# Patient Record
Sex: FEMALE | Race: BLACK OR AFRICAN AMERICAN | Employment: UNEMPLOYED
[De-identification: names, ages, dates, MRNs, and addresses within clinical notes are randomized per-mention and may not be internally consistent; named-entity substitution may affect disease eponyms.]

---

## 2024-06-10 ENCOUNTER — HOSPITAL ENCOUNTER (OUTPATIENT)
Facility: HOSPITAL | Age: 49
Discharge: HOME | End: 2024-06-13
Payer: MEDICARE

## 2024-06-10 VITALS — HEIGHT: 65 IN | BODY MASS INDEX: 41.69 KG/M2 | WEIGHT: 250.22 LBS

## 2024-06-10 DIAGNOSIS — Z12.31 SCREENING MAMMOGRAM FOR HIGH-RISK PATIENT: ICD-10-CM

## 2024-06-10 PROCEDURE — 77067 SCR MAMMO BI INCL CAD: CPT

## 2024-07-10 ENCOUNTER — HOSPITAL ENCOUNTER (OUTPATIENT)
Age: 49
Discharge: HOME | End: 2024-07-13
Payer: MEDICARE

## 2024-07-10 ENCOUNTER — TRANSCRIBE ORDERS (OUTPATIENT)
Age: 49
End: 2024-07-10

## 2024-07-10 DIAGNOSIS — Z79.899 NEED FOR PROPHYLACTIC CHEMOTHERAPY: Primary | ICD-10-CM

## 2024-07-10 DIAGNOSIS — Z79.899 NEED FOR PROPHYLACTIC CHEMOTHERAPY: ICD-10-CM

## 2024-07-10 LAB
ALBUMIN SERPL-MCNC: 3.2 G/DL (ref 3.4–5)
ALBUMIN/GLOB SERPL: 0.8 (ref 0.8–1.7)
ALP SERPL-CCNC: 55 U/L (ref 45–117)
ALT SERPL-CCNC: 18 U/L (ref 13–56)
ANION GAP SERPL CALC-SCNC: 6 MMOL/L (ref 3–18)
AST SERPL W P-5'-P-CCNC: 20 U/L (ref 10–38)
BASOPHILS # BLD: 0.1 K/UL (ref 0–0.1)
BASOPHILS NFR BLD: 1 % (ref 0–2)
BILIRUB SERPL-MCNC: 0.2 MG/DL (ref 0.2–1)
BUN SERPL-MCNC: 11 MG/DL (ref 7–18)
BUN/CREAT SERPL: 14 (ref 12–20)
CA-I BLD-MCNC: 9.1 MG/DL (ref 8.5–10.1)
CHLORIDE SERPL-SCNC: 108 MMOL/L (ref 100–111)
CO2 SERPL-SCNC: 27 MMOL/L (ref 21–32)
CREAT SERPL-MCNC: 0.76 MG/DL (ref 0.6–1.3)
DIFFERENTIAL METHOD BLD: ABNORMAL
EOSINOPHIL # BLD: 0.2 K/UL (ref 0–0.4)
EOSINOPHIL NFR BLD: 2 % (ref 0–5)
ERYTHROCYTE [DISTWIDTH] IN BLOOD BY AUTOMATED COUNT: 13.7 % (ref 11.6–14.5)
GLOBULIN SER CALC-MCNC: 3.8 G/DL (ref 2–4)
GLUCOSE SERPL-MCNC: 107 MG/DL (ref 74–99)
HCT VFR BLD AUTO: 35.1 % (ref 35–45)
HGB BLD-MCNC: 12.4 G/DL (ref 12–16)
IMM GRANULOCYTES # BLD AUTO: 0.1 K/UL (ref 0–0.04)
IMM GRANULOCYTES NFR BLD AUTO: 1 % (ref 0–0.5)
LYMPHOCYTES # BLD: 4.4 K/UL (ref 0.9–3.6)
LYMPHOCYTES NFR BLD: 41 % (ref 21–52)
MCH RBC QN AUTO: 28.3 PG (ref 24–34)
MCHC RBC AUTO-ENTMCNC: 35.3 G/DL (ref 31–37)
MCV RBC AUTO: 80.1 FL (ref 78–100)
MONOCYTES # BLD: 1 K/UL (ref 0.05–1.2)
MONOCYTES NFR BLD: 10 % (ref 3–10)
NEUTS SEG # BLD: 4.9 K/UL (ref 1.8–8)
NEUTS SEG NFR BLD: 45 % (ref 40–73)
NRBC # BLD: 0 K/UL (ref 0–0.01)
NRBC BLD-RTO: 0 PER 100 WBC
PLATELET # BLD AUTO: 347 K/UL (ref 135–420)
PMV BLD AUTO: 11.2 FL (ref 9.2–11.8)
POTASSIUM SERPL-SCNC: 3.8 MMOL/L (ref 3.5–5.5)
PROT SERPL-MCNC: 7 G/DL (ref 6.4–8.2)
RBC # BLD AUTO: 4.38 M/UL (ref 4.2–5.3)
SODIUM SERPL-SCNC: 141 MMOL/L (ref 136–145)
TSH SERPL DL<=0.05 MIU/L-ACNC: 1.23 UIU/ML (ref 0.36–3.74)
VALPROATE SERPL-MCNC: 61 UG/ML (ref 50–100)
WBC # BLD AUTO: 10.7 K/UL (ref 4.6–13.2)

## 2024-07-10 PROCEDURE — 80053 COMPREHEN METABOLIC PANEL: CPT

## 2024-07-10 PROCEDURE — 84443 ASSAY THYROID STIM HORMONE: CPT

## 2024-07-10 PROCEDURE — 85025 COMPLETE CBC W/AUTO DIFF WBC: CPT

## 2024-07-10 PROCEDURE — 83036 HEMOGLOBIN GLYCOSYLATED A1C: CPT

## 2024-07-10 PROCEDURE — 36415 COLL VENOUS BLD VENIPUNCTURE: CPT

## 2024-07-10 PROCEDURE — 80061 LIPID PANEL: CPT

## 2024-07-10 PROCEDURE — 80164 ASSAY DIPROPYLACETIC ACD TOT: CPT

## 2024-07-11 LAB
CHOLEST SERPL-MCNC: 181 MG/DL
EST. AVERAGE GLUCOSE BLD GHB EST-MCNC: 114 MG/DL
HBA1C MFR BLD: 5.6 % (ref 4.2–5.6)
HDLC SERPL-MCNC: 50 MG/DL (ref 40–60)
HDLC SERPL: 3.6 (ref 0–5)
LDLC SERPL CALC-MCNC: 100.4 MG/DL (ref 0–100)
LIPID PANEL: ABNORMAL
TRIGL SERPL-MCNC: 153 MG/DL
VLDLC SERPL CALC-MCNC: 30.6 MG/DL

## 2024-07-21 ENCOUNTER — HOSPITAL ENCOUNTER (EMERGENCY)
Age: 49
Discharge: HOME | End: 2024-07-21
Attending: EMERGENCY MEDICINE
Payer: MEDICARE

## 2024-07-21 VITALS
OXYGEN SATURATION: 99 % | SYSTOLIC BLOOD PRESSURE: 176 MMHG | RESPIRATION RATE: 18 BRPM | TEMPERATURE: 99.2 F | DIASTOLIC BLOOD PRESSURE: 99 MMHG | WEIGHT: 262 LBS | HEART RATE: 95 BPM | BODY MASS INDEX: 43.65 KG/M2 | HEIGHT: 65 IN

## 2024-07-21 DIAGNOSIS — I10 POORLY-CONTROLLED HYPERTENSION: ICD-10-CM

## 2024-07-21 DIAGNOSIS — K04.7 DENTAL ABSCESS: Primary | ICD-10-CM

## 2024-07-21 PROCEDURE — 99283 EMERGENCY DEPT VISIT LOW MDM: CPT

## 2024-07-21 PROCEDURE — 6370000000 HC RX 637 (ALT 250 FOR IP): Performed by: EMERGENCY MEDICINE

## 2024-07-21 RX ORDER — AMOXICILLIN AND CLAVULANATE POTASSIUM 875; 125 MG/1; MG/1
1 TABLET, FILM COATED ORAL
Status: COMPLETED | OUTPATIENT
Start: 2024-07-21 | End: 2024-07-21

## 2024-07-21 RX ORDER — AMOXICILLIN AND CLAVULANATE POTASSIUM 875; 125 MG/1; MG/1
1 TABLET, FILM COATED ORAL 2 TIMES DAILY
Qty: 20 TABLET | Refills: 0 | Status: SHIPPED | OUTPATIENT
Start: 2024-07-21 | End: 2024-07-31

## 2024-07-21 RX ADMIN — AMOXICILLIN AND CLAVULANATE POTASSIUM 1 TABLET: 875; 125 TABLET, FILM COATED ORAL at 11:04

## 2024-07-21 NOTE — ED PROVIDER NOTES
[facility] EMERGENCY DEPT  eMERGENCY dEPARTMENT eNCOUnter        Pt Name: Lisa Randle  MRN: [id number]  Birthdate 1975  Date of evaluation: 7/21/2024  Provider: Jaskaran Polanco MD  PCP: Maximo Rose MD  Note Started: 10:47 AM EDT 7/21/2024          CHIEF COMPLAINT       Chief Complaint   Patient presents with    Dental Pain       HISTORY OF PRESENT ILLNESS        Patient with a history of poor dentition/dental caries with several tooth extractions in the past.  She does have a dentist who follows her.  For the last 3 days had a sad increase pain and swelling in the right maxillary region of the face.  She has had some foul tasting drainage from the affected tooth.  No fevers.  No treatment so far    Nursing Notes were all reviewed and agreed with or any disagreements were addressed  in the HPI.    REVIEW OF SYSTEMS         The following 7 systems are reviewed and negative except as noted in the HPI: Constitutional, ENT, CV, GI, Pulmonary, , and skin         PASTMEDICAL HISTORY   History reviewed. No pertinent past medical history.      SURGICAL HISTORY       Past Surgical History:   Procedure Laterality Date    HYSTERECTOMY (CERVIX STATUS UNKNOWN)           CURRENT MEDICATIONS       Previous Medications    No medications on file       ALLERGIES     Patient has no known allergies.    FAMILY HISTORY       Family History   Problem Relation Age of Onset    Breast Cancer Maternal Aunt           SOCIAL HISTORY       Social History     Socioeconomic History    Marital status: Single     Spouse name: None    Number of children: None    Years of education: None    Highest education level: None   Tobacco Use    Smoking status: Every Day     Current packs/day: 0.50     Types: Cigarettes    Smokeless tobacco: Never       SCREENINGS        [place] Coma Scale  Eye Opening: Spontaneous  Best Verbal Response: Oriented  Best Motor Response: Obeys commands  [place] Coma Scale Score: 15                ARLENE

## 2024-07-21 NOTE — DISCHARGE INSTRUCTIONS
Take the Augmentin as directed, your next dose this evening.  As we discussed, perform warm salt water gargles multiple times over the course of the day and gently massage the area of the face to facilitate drainage from the tooth.  Call your dentist tomorrow to make him/her aware of your dental abscess and treatment in the ED and to arrange expedited follow-up.  Use over-the-counter medications as needed for pain. Return for fever, progressive pain or swelling, or if concerned.  Your blood pressure is elevated today.  Please follow-up in the coming week and your primary care doctor for blood pressure recheck.

## 2024-07-21 NOTE — ED TRIAGE NOTES
Pt complains of right sided cheek swelling and dental pain x 2 days. Pt states she has a broken tooth and believes it to be infected. Pt called her dentis yesterday but could not wait for an appointment. Pt took a dose of tylenol around 7am and rates pain 8/10 currently.

## 2024-08-29 ENCOUNTER — HOSPITAL ENCOUNTER (OUTPATIENT)
Age: 49
Setting detail: RECURRING SERIES
End: 2024-08-29
Payer: COMMERCIAL

## 2024-08-29 PROCEDURE — 97162 PT EVAL MOD COMPLEX 30 MIN: CPT

## 2024-08-29 NOTE — THERAPY EVALUATION
frequency and improved sleep at night in 3 weeks.     Long Term Goals:  Pt will improve R UE strength to 4+/5 for improved ability to lift and carry with duties caring for mother in 6 weeks.   Pt will improve NDI score by 10 points for improved ability to throw corn hole bags with recreation hobbies in 6 weeks.   Pt will improve cervical rotation AROM to 70 degrees and sidebending AROM to 35 degrees for improved ability to scan environment with mobility & conversing with others in 6 weeks.   Pt will report 100% headache resolve with daily activity and sleep in 6 weeks.       Frequency / Duration: Patient to be seen  2  times per week for 6  weeks:  Patient / Caregiver education and instruction: self care and activity modification    Therapist Signature: Marilyn Vernon PT, DPT Date: 8/29/2024   Certification Period: 08/29/24  - 11/27/24  Time: 2:52 PM   ===========================================================================================  I certify that the above Physical Therapy Services are being furnished while the patient is under my care.  I agree with the treatment plan and certify that this therapy is necessary.    Physician Signature:        Date:       Time:     Please sign and return to [facility] or you may fax the signed copy to [phone]. Please call [phone] if more information required. Thank you.

## 2024-08-29 NOTE — THERAPY EVALUATION
PT CERVICAL EVAL & DAILY [id number]    Patient Name: Lisa Randle  Date:2024  : 1975  [x]  Patient  Verified  Payor: [facility] [facility] / Plan: [facility] [employer] [facility] / Product Type: *No Product type* /    In time:1500  Out time:1551  Total Treatment Time (min): 51  Visit #: 1       Treatment Area: Tension-type headache, unspecified, not intractable [G44.209]      SUBJECTIVE  Pt presents today with reports of tension headaches x several months that now include neck and shoulder pain on right. Pt started medication for HTN last week. Pt has been taking care of her mother who requires total assist after she suffered a stroke. Patient takes care of her mother every evening & states that may be stress attributed to onset of headaches. Pt does not have to lift her mom, hoa lift present. Pt is RHD and is independent with ADL and household chores with increase time and rest breaks. Pt reports numbness/tingling down R UE towards hands at times. No prior history of neck pain reported.     Patient Goals: \"pain-free\"    Pain Level (0-10 scale): 7/10  [x]Sharp  []Dull  []Achy []Burning []Throbbing []N&T []Other:  []constant []intermittent []improving [x]worsening []no change since onset         No past medical history on file.  Past Surgical History:   Procedure Laterality Date    HYSTERECTOMY (CERVIX STATUS UNKNOWN)         General Health:  Red Flags Indicated? [] Yes    [x] No  [] Yes [] No Recent weight change (If yes, due to dieting? [] Yes  [] No)   [] Yes [] No Persistent cough  [] Yes [] No Unremitting pain at night  [] Yes [] No Dizziness  [] Yes [] No Blurred vision  [] Yes [] No Hands more cold or painful in cold weather  [] Yes [] No Ringing in ears  [] Yes [] No Difficulty swallowing  [] Yes [] No Dysfunction of bowel or bladder  [] Yes [] No Recent illness within past 3 weeks (i.e, cold, flu)  [] Yes [] No Jaw pain    Past History/Treatments: none      Headaches: Do you have      []Intermittent   []Continuous Lbs:  [] before manual  [] after manual    []  Ultrasound: []Continuous   [] Pulsed                           []1MHz   []3MHz Location:  W/cm2:    []  Iontophoresis with dexamethasone         Location: [] Take home patch   [] In clinic    []  Ice     []  heat  []  Ice massage  []  Laser   []  Anodyne Position:  Location:    []  Laser with stim  []  Other: Position:  Location:    []  Vasopneumatic Device Pressure:       [] lo [] med [] hi   Temperature: [] lo [] med [] hi         51 min [x]Eval                  []Re-Eval           With   [] TE   [] TA   [] neuro   [x] other: Patient Education: [] Review HEP    [] Progressed/Changed HEP based on:   [x] positioning   [] body mechanics   [] transfers   [x] heat/ice application    [x] other: stress relieving activities         Pain Level (0-10 scale) post treatment: 6/10    Assessment:  [x]  See Plan of Care  []  See progress note/recertification  []  See Discharge Summary      Marilyn Vernon, PT,  DPT  8/29/2024  2:49 PM

## 2024-09-04 ENCOUNTER — HOSPITAL ENCOUNTER (OUTPATIENT)
Age: 49
Setting detail: RECURRING SERIES
Discharge: HOME | End: 2024-09-07
Payer: COMMERCIAL

## 2024-09-04 PROCEDURE — 97110 THERAPEUTIC EXERCISES: CPT

## 2024-09-04 NOTE — PROGRESS NOTES
PT DAILY TREATMENT NOTE 8    Patient Name: Lisa Randle  Date:2024  : 1975  [x]  Patient  Verified  Payor: [employer] [facility] / [facility] [employer] [facility] / Product Type: *No Product type* /    In time:1413  Out time:1456  Total Treatment Time (min): 46  Total Timed Codes (min): 36  1:1 Treatment Time (min): 36   Visit #: 2     Treatment Area: Tension-type headache, unspecified, not intractable [G44.209]    SUBJECTIVE  Chief complaint is soreness in neck. She says, \"I can't even turn my head today.\"    Pain Level (0-10 scale): 6    Any medication changes, allergies to medications, adverse drug reactions, diagnosis change, or new procedure performed?: [x] No    [] Yes (see summary sheet for update)    OBJECTIVE  Modality rationale: decrease pain and increase tissue extensibility to improve the patient’s ability to fully participate in rehab activities.    Min Type Additional Details    [] Estim: []Att   []Unatt  []TENS instruct                 []IFC  []Premod   []NMES                       []Other:  []w/US   []w/ice   []w/heat  Position:  Location:    []  Traction: [] Cervical       []Lumbar                       [] Prone          []Supine                       []Intermittent   []Continuous Lbs:  [] before manual  [] after manual    []  Ultrasound: []Continuous   [] Pulsed                           []1MHz   []3MHz Location:  W/cm2:    []  Iontophoresis with dexamethasone         Location: [] Take home patch   [] In clinic   10 []  Ice     [x]  heat  []  Ice massage Position: seated  Location: neck    []  Vasopneumatic Device Pressure: [] lo [] med [] hi   Temp: [] lo [] med [] hi   [] Skin assessment post-treatment:  []intact []redness- no adverse reaction       []redness - adverse reaction:     35 min Therapeutic Exercise:  [x] See flow sheet :   Rationale: increase ROM, increase strength, improve coordination, improve balance, and increase proprioception to improve the

## 2024-09-06 ENCOUNTER — APPOINTMENT (OUTPATIENT)
Age: 49
End: 2024-09-06
Payer: COMMERCIAL

## 2024-09-11 ENCOUNTER — HOSPITAL ENCOUNTER (OUTPATIENT)
Age: 49
Setting detail: RECURRING SERIES
Discharge: HOME | End: 2024-09-14
Payer: COMMERCIAL

## 2024-09-11 PROCEDURE — 97110 THERAPEUTIC EXERCISES: CPT

## 2025-03-05 ENCOUNTER — HOSPITAL ENCOUNTER (OUTPATIENT)
Age: 50
Discharge: HOME | End: 2025-03-08
Payer: MEDICARE

## 2025-03-05 DIAGNOSIS — Z79.899 NEED FOR PROPHYLACTIC CHEMOTHERAPY: ICD-10-CM

## 2025-03-05 LAB
ALBUMIN SERPL-MCNC: 3.3 G/DL (ref 3.4–5)
ALBUMIN/GLOB SERPL: 0.8 (ref 0.8–1.7)
ALP SERPL-CCNC: 64 U/L (ref 45–117)
ALT SERPL-CCNC: 13 U/L (ref 13–56)
ANION GAP SERPL CALC-SCNC: 8 MMOL/L (ref 3–18)
AST SERPL W P-5'-P-CCNC: 7 U/L (ref 10–38)
BASOPHILS # BLD: 0 K/UL (ref 0–0.1)
BASOPHILS NFR BLD: 0 % (ref 0–2)
BILIRUB SERPL-MCNC: 0.3 MG/DL (ref 0.2–1)
BUN SERPL-MCNC: 9 MG/DL (ref 7–18)
BUN/CREAT SERPL: 13 (ref 12–20)
CA-I BLD-MCNC: 9 MG/DL (ref 8.5–10.1)
CHLORIDE SERPL-SCNC: 106 MMOL/L (ref 100–111)
CHOLEST SERPL-MCNC: 182 MG/DL
CO2 SERPL-SCNC: 29 MMOL/L (ref 21–32)
CREAT SERPL-MCNC: 0.69 MG/DL (ref 0.6–1.3)
DIFFERENTIAL METHOD BLD: ABNORMAL
EOSINOPHIL # BLD: 0.35 K/UL (ref 0–0.4)
EOSINOPHIL NFR BLD: 3 % (ref 0–5)
ERYTHROCYTE [DISTWIDTH] IN BLOOD BY AUTOMATED COUNT: 13.6 % (ref 11.6–14.5)
EST. AVERAGE GLUCOSE BLD GHB EST-MCNC: 126 MG/DL
GLOBULIN SER CALC-MCNC: 4 G/DL (ref 2–4)
GLUCOSE SERPL-MCNC: 83 MG/DL (ref 74–99)
HBA1C MFR BLD: 6 % (ref 4.2–5.6)
HCT VFR BLD AUTO: 36.7 % (ref 35–45)
HDLC SERPL-MCNC: 43 MG/DL (ref 40–60)
HDLC SERPL: 4.2 (ref 0–5)
HGB BLD-MCNC: 12.9 G/DL (ref 12–16)
IMM GRANULOCYTES # BLD AUTO: 0 K/UL
IMM GRANULOCYTES NFR BLD AUTO: 0 %
LDLC SERPL CALC-MCNC: 92.2 MG/DL (ref 0–100)
LIPID PANEL: ABNORMAL
LYMPHOCYTES # BLD: 4.68 K/UL (ref 0.9–3.6)
LYMPHOCYTES NFR BLD: 40 % (ref 21–52)
MCH RBC QN AUTO: 29.1 PG (ref 24–34)
MCHC RBC AUTO-ENTMCNC: 35.1 G/DL (ref 31–37)
MCV RBC AUTO: 82.7 FL (ref 78–100)
MONOCYTES # BLD: 1.52 K/UL (ref 0.05–1.2)
MONOCYTES NFR BLD: 13 % (ref 3–10)
NEUTS BAND NFR BLD MANUAL: 1 % (ref 0–5)
NEUTS SEG # BLD: 5.15 K/UL (ref 1.8–8)
NEUTS SEG NFR BLD: 43 % (ref 40–73)
NRBC # BLD: 0 K/UL (ref 0–0.01)
NRBC BLD-RTO: 0 PER 100 WBC
PLATELET # BLD AUTO: 327 K/UL (ref 135–420)
PMV BLD AUTO: 11.3 FL (ref 9.2–11.8)
POTASSIUM SERPL-SCNC: 3.7 MMOL/L (ref 3.5–5.5)
PROT SERPL-MCNC: 7.3 G/DL (ref 6.4–8.2)
RBC # BLD AUTO: 4.44 M/UL (ref 4.2–5.3)
RBC MORPH BLD: ABNORMAL
SODIUM SERPL-SCNC: 143 MMOL/L (ref 136–145)
TRIGL SERPL-MCNC: 234 MG/DL
TSH SERPL DL<=0.05 MIU/L-ACNC: 0.68 UIU/ML (ref 0.36–3.74)
VALPROATE SERPL-MCNC: 75 UG/ML (ref 50–100)
VLDLC SERPL CALC-MCNC: 46.8 MG/DL
WBC # BLD AUTO: 11.7 K/UL (ref 4.6–13.2)

## 2025-03-05 PROCEDURE — 83036 HEMOGLOBIN GLYCOSYLATED A1C: CPT

## 2025-03-05 PROCEDURE — 80061 LIPID PANEL: CPT

## 2025-03-05 PROCEDURE — 36415 COLL VENOUS BLD VENIPUNCTURE: CPT

## 2025-03-05 PROCEDURE — 80164 ASSAY DIPROPYLACETIC ACD TOT: CPT

## 2025-03-05 PROCEDURE — 85025 COMPLETE CBC W/AUTO DIFF WBC: CPT

## 2025-03-05 PROCEDURE — 80053 COMPREHEN METABOLIC PANEL: CPT

## 2025-03-05 PROCEDURE — 84443 ASSAY THYROID STIM HORMONE: CPT

## 2025-03-21 ENCOUNTER — TELEPHONE (OUTPATIENT)
Age: 50
End: 2025-03-21

## 2025-03-21 ENCOUNTER — TRANSCRIBE ORDERS (OUTPATIENT)
Facility: HOSPITAL | Age: 50
End: 2025-03-21

## 2025-03-21 DIAGNOSIS — Z12.31 ENCOUNTER FOR SCREENING MAMMOGRAM FOR MALIGNANT NEOPLASM OF BREAST: Primary | ICD-10-CM

## 2025-03-21 NOTE — TELEPHONE ENCOUNTER
Referral for a screening colonoscopy. Please review and advise.    Referral  Referral # [id number]  Referral Information    Referral # Creation Date Referral Status Status Update    [id number] 03/21/2025 Pending Review 03/21/2025: Status History     Status Reason Referral Type Referral Reasons Referral Class   Pending Physician Review Eval and Treat Specialty Services Required Incoming     To Specialty To Provider To Location/Place of Service To Department   Gastroenterology Dao Hager MD none [facility] - GASTROENTEROLOGY     To Vendor Referred By By Location/Place of Service By Department   none Maximo Rose MD none none     Priority Start Date Expiration Date Referral Entered By   Routine 03/21/2025 03/21/2026 Kacie Gallardo MA     Visits Requested Visits Authorized Visits Completed Visits Scheduled   2 2       Coverages    Payer Plan Auth. Required? Covered? Member # Authorized From Expires Auth # Precert. # Comment   [employer] MED[employer]ARE [employer] BETTER [facility] MEDICARE -- Covered [id number] 3/1/2024 -- -- -- --     Referral Information    Referral # Creation Date Referral Status Status Update    [id number] 03/21/2025 Pending Review 03/21/2025: Status History     Status Reason Referral Type Referral Reasons Referral Class   Pending Physician Review Eval and Treat Specialty Services Required Incoming     To Specialty To Provider To Location/Place of Service To Department   Gastroenterology Dao Hager MD none [facility] - GASTROENTEROLOGY     To Vendor Referred By By Location/Place of Service By Department   none Maximo Rose MD none none     Priority Start Date Expiration Date Referral Entered By   Routine 03/21/2025 03/21/2026 Kacie Gallardo MA     Visits Requested Visits Authorized Visits Completed Visits Scheduled   2 2       Procedure Information    Service Details  Procedure Modifiers Provider Requested Approved   REF25 - AMB REFERRAL TO